# Patient Record
Sex: FEMALE | NOT HISPANIC OR LATINO | URBAN - METROPOLITAN AREA
[De-identification: names, ages, dates, MRNs, and addresses within clinical notes are randomized per-mention and may not be internally consistent; named-entity substitution may affect disease eponyms.]

---

## 2017-07-25 ENCOUNTER — APPOINTMENT (OUTPATIENT)
Dept: GENERAL RADIOLOGY | Facility: CLINIC | Age: 52
End: 2017-07-25
Attending: EMERGENCY MEDICINE
Payer: COMMERCIAL

## 2017-07-25 ENCOUNTER — HOSPITAL ENCOUNTER (EMERGENCY)
Facility: CLINIC | Age: 52
Discharge: HOME | End: 2017-07-25
Attending: EMERGENCY MEDICINE | Admitting: EMERGENCY MEDICINE
Payer: COMMERCIAL

## 2017-07-25 VITALS
DIASTOLIC BLOOD PRESSURE: 79 MMHG | SYSTOLIC BLOOD PRESSURE: 147 MMHG | RESPIRATION RATE: 16 BRPM | OXYGEN SATURATION: 96 % | TEMPERATURE: 99.2 F

## 2017-07-25 DIAGNOSIS — J01.00 ACUTE NON-RECURRENT MAXILLARY SINUSITIS: ICD-10-CM

## 2017-07-25 LAB
ALBUMIN UR-MCNC: NEGATIVE MG/DL
ANION GAP SERPL CALCULATED.3IONS-SCNC: 8 MMOL/L (ref 3–14)
APPEARANCE UR: CLEAR
BASOPHILS # BLD AUTO: 0 10E9/L (ref 0–0.2)
BASOPHILS NFR BLD AUTO: 0 %
BILIRUB UR QL STRIP: NEGATIVE
BUN SERPL-MCNC: 11 MG/DL (ref 7–30)
CALCIUM SERPL-MCNC: 9 MG/DL (ref 8.5–10.1)
CHLORIDE SERPL-SCNC: 105 MMOL/L (ref 94–109)
CO2 SERPL-SCNC: 30 MMOL/L (ref 20–32)
COLOR UR AUTO: NORMAL
CREAT SERPL-MCNC: 0.61 MG/DL (ref 0.52–1.04)
DIFFERENTIAL METHOD BLD: NORMAL
EOSINOPHIL # BLD AUTO: 0.2 10E9/L (ref 0–0.7)
EOSINOPHIL NFR BLD AUTO: 2.1 %
ERYTHROCYTE [DISTWIDTH] IN BLOOD BY AUTOMATED COUNT: 13.1 % (ref 10–15)
GFR SERPL CREATININE-BSD FRML MDRD: ABNORMAL ML/MIN/1.7M2
GLUCOSE SERPL-MCNC: 110 MG/DL (ref 70–99)
GLUCOSE UR STRIP-MCNC: NEGATIVE MG/DL
HCT VFR BLD AUTO: 42.3 % (ref 35–47)
HGB BLD-MCNC: 14 G/DL (ref 11.7–15.7)
HGB UR QL STRIP: NEGATIVE
IMM GRANULOCYTES # BLD: 0 10E9/L (ref 0–0.4)
IMM GRANULOCYTES NFR BLD: 0.1 %
KETONES UR STRIP-MCNC: NEGATIVE MG/DL
LACTATE BLD-SCNC: 1.4 MMOL/L (ref 0.7–2.1)
LEUKOCYTE ESTERASE UR QL STRIP: NEGATIVE
LYMPHOCYTES # BLD AUTO: 0.8 10E9/L (ref 0.8–5.3)
LYMPHOCYTES NFR BLD AUTO: 11.1 %
MCH RBC QN AUTO: 28.1 PG (ref 26.5–33)
MCHC RBC AUTO-ENTMCNC: 33.1 G/DL (ref 31.5–36.5)
MCV RBC AUTO: 85 FL (ref 78–100)
MONOCYTES # BLD AUTO: 0.1 10E9/L (ref 0–1.3)
MONOCYTES NFR BLD AUTO: 2 %
NEUTROPHILS # BLD AUTO: 6 10E9/L (ref 1.6–8.3)
NEUTROPHILS NFR BLD AUTO: 84.7 %
NITRATE UR QL: NEGATIVE
NRBC # BLD AUTO: 0 10*3/UL
NRBC BLD AUTO-RTO: 0 /100
PH UR STRIP: 6 PH (ref 5–7)
PLATELET # BLD AUTO: 178 10E9/L (ref 150–450)
POTASSIUM SERPL-SCNC: 3.6 MMOL/L (ref 3.4–5.3)
RBC # BLD AUTO: 4.99 10E12/L (ref 3.8–5.2)
RBC #/AREA URNS AUTO: <1 /HPF (ref 0–2)
SODIUM SERPL-SCNC: 143 MMOL/L (ref 133–144)
SP GR UR STRIP: 1.01 (ref 1–1.03)
URN SPEC COLLECT METH UR: NORMAL
UROBILINOGEN UR STRIP-MCNC: NORMAL MG/DL (ref 0–2)
WBC # BLD AUTO: 7.1 10E9/L (ref 4–11)
WBC #/AREA URNS AUTO: <1 /HPF (ref 0–2)

## 2017-07-25 PROCEDURE — 99284 EMERGENCY DEPT VISIT MOD MDM: CPT | Mod: 25 | Performed by: EMERGENCY MEDICINE

## 2017-07-25 PROCEDURE — 81001 URINALYSIS AUTO W/SCOPE: CPT | Performed by: EMERGENCY MEDICINE

## 2017-07-25 PROCEDURE — 80048 BASIC METABOLIC PNL TOTAL CA: CPT | Performed by: EMERGENCY MEDICINE

## 2017-07-25 PROCEDURE — 25000128 H RX IP 250 OP 636: Performed by: EMERGENCY MEDICINE

## 2017-07-25 PROCEDURE — 25000132 ZZH RX MED GY IP 250 OP 250 PS 637: Performed by: EMERGENCY MEDICINE

## 2017-07-25 PROCEDURE — 99284 EMERGENCY DEPT VISIT MOD MDM: CPT | Mod: Z6 | Performed by: EMERGENCY MEDICINE

## 2017-07-25 PROCEDURE — 71020 XR CHEST 2 VW: CPT

## 2017-07-25 PROCEDURE — 87040 BLOOD CULTURE FOR BACTERIA: CPT | Performed by: EMERGENCY MEDICINE

## 2017-07-25 PROCEDURE — 85025 COMPLETE CBC W/AUTO DIFF WBC: CPT | Performed by: EMERGENCY MEDICINE

## 2017-07-25 PROCEDURE — 83605 ASSAY OF LACTIC ACID: CPT | Performed by: EMERGENCY MEDICINE

## 2017-07-25 PROCEDURE — 96361 HYDRATE IV INFUSION ADD-ON: CPT | Performed by: EMERGENCY MEDICINE

## 2017-07-25 PROCEDURE — 96360 HYDRATION IV INFUSION INIT: CPT | Performed by: EMERGENCY MEDICINE

## 2017-07-25 RX ORDER — ACETAMINOPHEN 325 MG/1
650 TABLET ORAL EVERY 4 HOURS PRN
Status: DISCONTINUED | OUTPATIENT
Start: 2017-07-25 | End: 2017-07-25 | Stop reason: HOSPADM

## 2017-07-25 RX ORDER — LIDOCAINE 40 MG/G
CREAM TOPICAL
Status: DISCONTINUED | OUTPATIENT
Start: 2017-07-25 | End: 2017-07-25 | Stop reason: HOSPADM

## 2017-07-25 RX ORDER — ONDANSETRON 2 MG/ML
4 INJECTION INTRAMUSCULAR; INTRAVENOUS EVERY 30 MIN PRN
Status: DISCONTINUED | OUTPATIENT
Start: 2017-07-25 | End: 2017-07-25 | Stop reason: HOSPADM

## 2017-07-25 RX ORDER — MONTELUKAST SODIUM 10 MG/1
10 TABLET ORAL AT BEDTIME
COMMUNITY

## 2017-07-25 RX ORDER — SODIUM CHLORIDE 9 MG/ML
1000 INJECTION, SOLUTION INTRAVENOUS CONTINUOUS
Status: DISCONTINUED | OUTPATIENT
Start: 2017-07-25 | End: 2017-07-25 | Stop reason: HOSPADM

## 2017-07-25 RX ORDER — CETIRIZINE HYDROCHLORIDE 10 MG/1
10 TABLET ORAL DAILY
COMMUNITY

## 2017-07-25 RX ADMIN — ACETAMINOPHEN 650 MG: 325 TABLET, FILM COATED ORAL at 04:12

## 2017-07-25 RX ADMIN — SODIUM CHLORIDE 1000 ML: 9 INJECTION, SOLUTION INTRAVENOUS at 04:10

## 2017-07-25 RX ADMIN — SODIUM CHLORIDE 1000 ML: 9 INJECTION, SOLUTION INTRAVENOUS at 05:22

## 2017-07-25 RX ADMIN — AMOXICILLIN AND CLAVULANATE POTASSIUM 1 TABLET: 875; 125 TABLET, FILM COATED ORAL at 06:01

## 2017-07-25 ASSESSMENT — ENCOUNTER SYMPTOMS
DIFFICULTY URINATING: 0
SORE THROAT: 0
MYALGIAS: 0
COUGH: 1
DYSURIA: 0
FATIGUE: 0
EYE REDNESS: 0
ARTHRALGIAS: 0
NECK PAIN: 0
BACK PAIN: 0
SINUS PRESSURE: 1
NAUSEA: 1
RHINORRHEA: 1
EYE PAIN: 0
VOMITING: 1
BRUISES/BLEEDS EASILY: 0
TROUBLE SWALLOWING: 0
PALPITATIONS: 0
FACIAL SWELLING: 0
CHILLS: 1
DIZZINESS: 0
ABDOMINAL PAIN: 0
FLANK PAIN: 0
CONFUSION: 0
LIGHT-HEADEDNESS: 0
CHEST TIGHTNESS: 0
FEVER: 1
DIAPHORESIS: 0
FREQUENCY: 0
SHORTNESS OF BREATH: 1
ALLERGIC REACTION: 1
HEADACHES: 0
DIARRHEA: 0
WEAKNESS: 0
NECK STIFFNESS: 0

## 2017-07-25 NOTE — ED AVS SNAPSHOT
[facility], Emergency Department    [address]    [place] [address]    Phone:  [phone]    Fax:  [phone]                                       Orquidea Romero   MRN: [id number]    Department:  [facility], Emergency Department   Date of Visit:  7/25/2017           Patient Information     Date Of Birth          1965        Your diagnoses for this visit were:     Acute non-recurrent maxillary sinusitis        You were seen by Gerardo Morales MD.      Follow-up Information     Follow up with Kaleigh Santoro Md.    Specialty:  Clinic    Contact information:    [address] [facility] [address]  [phone]          Discharge Instructions       Take antibiotics as directed.  Take Tylenol (acetaminophen) for pain or fever.  Clinic follow up this week at your primary care provider clinic or at clinic below.  Drink plenty of fluids.  Please make an appointment to follow up with [facility] (phone: [phone])     24 Hour Appointment Hotline       To make an appointment at any [facility], call [phone] ([phone]). If you don't have a family doctor or clinic, we will help you find one. [facility] are conveniently located to serve the needs of you and your family.             Review of your medicines      START taking        Dose / Directions Last dose taken    amoxicillin-clavulanate 875-125 MG per tablet   Commonly known as:  AUGMENTIN   Dose:  1 tablet   Quantity:  20 tablet        Take 1 tablet by mouth 2 times daily for 10 days   Refills:  0          Our records show that you are taking the medicines listed below. If these are incorrect, please call your family doctor or clinic.        Dose / Directions Last dose taken    cetirizine 10 MG tablet   Commonly known as:  zyrTEC   Dose:  10 mg        Take 10 mg by mouth daily   Refills:  0        fluticasone 50 MCG/ACT spray   Commonly known as:  FLONASE   Dose:  2 spray        2 sprays by Both  Nostrils route daily.   Refills:  0        montelukast 10 MG tablet   Commonly known as:  SINGULAIR   Dose:  10 mg        Take 10 mg by mouth At Bedtime   Refills:  0        TYLENOL 325 MG tablet   Dose:  1-2 tablet   Generic drug:  acetaminophen        Take 1-2 tablets by mouth every 6 hours as needed.   Refills:  0                Prescriptions were sent or printed at these locations (1 Prescription)                   Other Prescriptions                Printed at Department/Unit printer (1 of 1)         amoxicillin-clavulanate (AUGMENTIN) 875-125 MG per tablet                Procedures and tests performed during your visit     Basic metabolic panel    Blood culture ONE site    CBC with platelets differential    Chest XR,  PA & LAT    Lactic acid whole blood    Peripheral IV catheter    UA with Microscopic reflex to Culture      Orders Needing Specimen Collection     None      Pending Results     Date and Time Order Name Status Description    7/25/2017 0351 Blood culture ONE site In process     7/25/2017 0351 Chest XR,  PA & LAT Preliminary             Pending Culture Results     Date and Time Order Name Status Description    7/25/2017 0351 Blood culture ONE site In process             Pending Results Instructions     If you had any lab results that were not finalized at the time of your Discharge, you can call the ED Lab Result RN at [phone]. You will be contacted by this team for any positive Lab results or changes in treatment. The nurses are available 7 days a week from 10A to 6:30P.  You can leave a message 24 hours per day and they will return your call.        Thank you for choosing [facility]       Thank you for choosing [facility] for your care. Our goal is always to provide you with excellent care. Hearing back from our patients is one way we can continue to improve our services. Please take a few minutes to complete the written survey that you may receive in the mail after you visit with us. Thank you!    "     MyChart Information     [employer] lets you send messages to your doctor, view your test results, renew your prescriptions, schedule appointments and more. To sign up, go to [email or web address] . Click on \"Log in\" on the left side of the screen, which will take you to the Welcome page. Then click on \"Sign up Now\" on the right side of the page.     You will be asked to enter the access code listed below, as well as some personal information. Please follow the directions to create your username and password.     Your access code is: [email or web address]  Expires: 10/23/2017  5:51 AM     Your access code will  in 90 days. If you need help or a new code, please call your [facility] clinic or [phone].        Care EveryWhere ID     This is your Care EveryWhere ID. This could be used by other organizations to access your [facility] medical records  [id number]        Equal Access to Services     NICOLE BORRERO : Hadii denny gauthiero Sojeri, waaxda luqadaha, qaybta kaalmada adenorth, rocky bryant . So [facility] [phone].    ATENCIÓN: Si habla español, tiene a mata disposición servicios gratuitos de asistencia lingüística. Llame al [phone].    We comply with applicable federal civil rights laws and [place] laws. We do not discriminate on the basis of race, color, national origin, age, disability sex, sexual orientation or gender identity.            After Visit Summary       This is your record. Keep this with you and show to your community pharmacist(s) and doctor(s) at your next visit.                  "

## 2017-07-25 NOTE — ED AVS SNAPSHOT
[facility], [place], Emergency Department    [address]    [place] [address]    Phone:  [phone]    Fax:  [phone]                                       Orquidea Romero   MRN: [id number]    Department:  [facility], Emergency Department   Date of Visit:  7/25/2017           After Visit Summary Signature Page     I have received my discharge instructions, and my questions have been answered. I have discussed any challenges I see with this plan with the nurse or doctor.    ..........................................................................................................................................  Patient/Patient Representative Signature      ..........................................................................................................................................  Patient Representative Print Name and Relationship to Patient    ..................................................               ................................................  Date                                            Time    ..........................................................................................................................................  Reviewed by Signature/Title    ...................................................              ..............................................  Date                                                            Time

## 2017-07-25 NOTE — DISCHARGE INSTRUCTIONS
Take antibiotics as directed.  Take Tylenol (acetaminophen) for pain or fever.  Clinic follow up this week at your primary care provider clinic or at clinic below.  Drink plenty of fluids.  Please make an appointment to follow up with Located within [facility] (phone: [phone])

## 2017-07-25 NOTE — ED PROVIDER NOTES
History     Chief Complaint   Patient presents with     Allergic Reaction     rash, cough, fever started about an hour ago after taking meds     Patient is a 52 year old female presenting with allergic reaction. The history is provided by the patient and a relative. The history is limited by a language barrier. No  was used (patient and family member present decline .).   Allergic Reaction   Presenting symptoms: no difficulty swallowing      Orquidea Romero is a 52 year old female who presents with onset today of fever, cough, nasal congestion, rhinorrhea, vomiting. Skin on chest and neck appeared red to her earlier but is no longer so. No pruritus. No new medications. Cough is frequent and productive. No confusion or other mental status change. No diarrhea, melena or hematemesis. No chest pain but feels short of breath. Reports maxillary sinus pain and pressure.   I have reviewed the Medications, Allergies, Past Medical and Surgical History, and Social History in the [employer] system.  History reviewed. No pertinent past medical history.    Review of Systems   Constitutional: Positive for chills and fever. Negative for diaphoresis and fatigue.   HENT: Positive for congestion, postnasal drip, rhinorrhea and sinus pressure. Negative for dental problem, ear pain, facial swelling, mouth sores, sore throat and trouble swallowing.    Eyes: Negative for pain, redness and visual disturbance.   Respiratory: Positive for cough and shortness of breath. Negative for chest tightness.    Cardiovascular: Negative for chest pain, palpitations and leg swelling.   Gastrointestinal: Positive for nausea and vomiting. Negative for abdominal pain and diarrhea.   Genitourinary: Negative for difficulty urinating, dysuria, flank pain and frequency.   Musculoskeletal: Negative for arthralgias, back pain, myalgias, neck pain and neck stiffness.   Allergic/Immunologic: Negative for immunocompromised state.    Neurological: Negative for dizziness, syncope, weakness, light-headedness and headaches.   Hematological: Does not bruise/bleed easily.   Psychiatric/Behavioral: Negative for confusion.       Physical Exam   BP: 150/80  Heart Rate: 112  Temp: 100.7  F (38.2  C)  Resp: 18  SpO2: 94 %  Physical Exam   Constitutional: She appears well-developed and well-nourished. No distress.   HENT:   Head: Normocephalic and atraumatic.   Right Ear: Tympanic membrane and ear canal normal.   Left Ear: Tympanic membrane and ear canal normal.   Nose: Mucosal edema and rhinorrhea present. Right sinus exhibits maxillary sinus tenderness. Right sinus exhibits no frontal sinus tenderness. Left sinus exhibits maxillary sinus tenderness. Left sinus exhibits no frontal sinus tenderness.   Mouth/Throat: Uvula is midline, oropharynx is clear and moist and mucous membranes are normal. No oral lesions. No trismus in the jaw. No uvula swelling.   Eyes: Conjunctivae and EOM are normal. Pupils are equal, round, and reactive to light.   Neck: Normal range of motion. Neck supple.   No nuchal rigidity. No meningeal signs.   Cardiovascular: Regular rhythm, normal heart sounds and intact distal pulses.  Exam reveals no gallop and no friction rub.    No murmur heard.  Pulmonary/Chest: Effort normal and breath sounds normal. No respiratory distress.   Abdominal: Soft. There is no tenderness.   Musculoskeletal: Normal range of motion. She exhibits no edema or tenderness.   Neurological: She is alert.   Skin: Skin is warm and dry. No rash noted.   Psychiatric: She has a normal mood and affect. Her behavior is normal.   Nursing note and vitals reviewed.      ED Course     ED Course     Procedures             Critical Care time:  none               Labs Ordered and Resulted from Time of ED Arrival Up to the Time of Departure from the ED   BASIC METABOLIC PANEL - Abnormal; Notable for the following:        Result Value    Glucose 110 (*)     All other  components within normal limits   CBC WITH PLATELETS DIFFERENTIAL   LACTIC ACID WHOLE BLOOD   ROUTINE UA WITH MICROSCOPIC REFLEX TO CULTURE   PERIPHERAL IV CATHETER   BLOOD CULTURE     Medications   lidocaine 1 % 1 mL (not administered)   lidocaine (LMX4) kit (not administered)   sodium chloride (PF) 0.9% PF flush 3 mL (not administered)   sodium chloride (PF) 0.9% PF flush 3 mL (not administered)   0.9% sodium chloride BOLUS (0 mLs Intravenous Stopped 7/25/17 0552)     Followed by   0.9% sodium chloride infusion (0 mLs Intravenous Stopped 7/25/17 0552)   ondansetron (ZOFRAN) injection 4 mg (not administered)   acetaminophen (TYLENOL) tablet 650 mg (650 mg Oral Given 7/25/17 0412)   amoxicillin-clavulanate (AUGMENTIN) 875-125 MG per tablet 1 tablet (not administered)            Assessments & Plan (with Medical Decision Making)   Findings consistent with maxillary sinusitis. No evidence of sepsis or pneumonia. She is awake and alert and feels better with ED treatment. She is tolerating oral fluids well. Will treat with Augmentin and symptomatic measures. No vomiting in the ED. Follow up in clinic and return if persistent symptoms.  I have reviewed the nursing notes.    I have reviewed the findings, diagnosis, plan and need for follow up with the patient.    New Prescriptions    AMOXICILLIN-CLAVULANATE (AUGMENTIN) 875-125 MG PER TABLET    Take 1 tablet by mouth 2 times daily for 10 days       Final diagnoses:   Acute non-recurrent maxillary sinusitis       7/25/2017   [facility], [place], EMERGENCY DEPARTMENT     Gerardo Morales MD  07/25/17 0588

## 2017-07-31 LAB
BACTERIA SPEC CULT: NO GROWTH
Lab: NORMAL
MICRO REPORT STATUS: NORMAL
SPECIMEN SOURCE: NORMAL

## 2020-11-13 ENCOUNTER — DOCUMENTATION ONLY (OUTPATIENT)
Dept: CARE COORDINATION | Facility: CLINIC | Age: 55
End: 2020-11-13

## 2020-12-15 ENCOUNTER — TRANSFERRED RECORDS (OUTPATIENT)
Dept: HEALTH INFORMATION MANAGEMENT | Facility: CLINIC | Age: 55
End: 2020-12-15

## 2020-12-16 ENCOUNTER — VIRTUAL VISIT (OUTPATIENT)
Dept: NEUROLOGY | Facility: CLINIC | Age: 55
End: 2020-12-16
Payer: COMMERCIAL

## 2020-12-16 ENCOUNTER — PRE VISIT (OUTPATIENT)
Dept: NEUROLOGY | Facility: CLINIC | Age: 55
End: 2020-12-16

## 2020-12-16 DIAGNOSIS — G43.711 INTRACTABLE CHRONIC MIGRAINE WITHOUT AURA AND WITH STATUS MIGRAINOSUS: ICD-10-CM

## 2020-12-16 DIAGNOSIS — R51.9 CHRONIC DAILY HEADACHE: Primary | ICD-10-CM

## 2020-12-16 PROCEDURE — 99203 OFFICE O/P NEW LOW 30 MIN: CPT | Mod: 95 | Performed by: PSYCHIATRY & NEUROLOGY

## 2020-12-16 RX ORDER — LORATADINE 10 MG/1
10 TABLET ORAL
COMMUNITY

## 2020-12-16 RX ORDER — MECLIZINE HYDROCHLORIDE 25 MG/1
25 TABLET ORAL 3 TIMES DAILY PRN
COMMUNITY

## 2020-12-16 RX ORDER — OMEPRAZOLE 40 MG/1
40 CAPSULE, DELAYED RELEASE ORAL
COMMUNITY
Start: 2020-08-12

## 2020-12-16 RX ORDER — DIPHENHYDRAMINE HCL 25 MG
25 CAPSULE ORAL
COMMUNITY

## 2020-12-16 RX ORDER — PROPRANOLOL HYDROCHLORIDE 80 MG/1
80 TABLET ORAL 2 TIMES DAILY
Qty: 60 TABLET | Refills: 3 | Status: SHIPPED | OUTPATIENT
Start: 2020-12-16 | End: 2021-01-28

## 2020-12-16 RX ORDER — SUMATRIPTAN 50 MG/1
50 TABLET, FILM COATED ORAL
Qty: 18 TABLET | Refills: 3 | Status: SHIPPED | OUTPATIENT
Start: 2020-12-16

## 2020-12-16 RX ORDER — LEVOTHYROXINE SODIUM 25 UG/1
1 TABLET ORAL
COMMUNITY
Start: 2020-08-08

## 2020-12-16 NOTE — LETTER
"12/16/2020       RE: Orquidea Romero  [address] [place] [address]     Dear Colleague,    Thank you for referring your patient, Orquidea Romero, to the [facility] NEUROLOGY CLINIC [place] at [facility]. Please see a copy of my visit note below.          Orquidea Romero is a 55 year old female who is being evaluated via a billable video visit.      The patient has been notified of following:     \"This video visit will be conducted via a call between you and your physician/provider. We have found that certain health care needs can be provided without the need for an in-person physical exam.  This service lets us provide the care you need with a video conversation.  If a prescription is necessary we can send it directly to your pharmacy.  If lab work is needed we can place an order for that and you can then stop by our lab to have the test done at a later time.    Video visits are billed at different rates depending on your insurance coverage.  Please reach out to your insurance provider with any questions.    If during the course of the call the physician/provider feels a video visit is not appropriate, you will not be charged for this service.\"    Patient has given verbal consent for Video visit? Yes  How would you like to obtain your AVS? MyChart  If you are dropped from the video visit, the video invite should be resent to: Send to e-mail at: [email or web address]  Will anyone else be joining your video visit? No        Video-Visit Details    Type of service:  Video Visit    Video Start Time: 10:04 AM  Video End Time: 10:47 AM    Originating Location (pt. Location): Home    Distant Location (provider location):  [facility] NEUROLOGY [facility]     Platform used for Video Visit: Minnie Mayer    [facility]  Virtual Neurology Consult     Orquidea Romero MRN# [id number]   Date " of Birth: 1965 Age: 55 year old            Assessment and Recommendations:     Orquidea Romero is a 55 year old female with past medical history of GERD, allergy presents to the headache clinic for evaluation of severe headache.    Patient has been having headache for years which got worse in the past 3 months.  This headache is consistent with chronic migraine with visual aura.  Although the neurological exam is normal, this acute change in the severity and frequency of her headache warrants brain imaging to rule out structural lesion.  Sumatriptan worked some for her headache but she was using it regularly twice a day.  She tried cyclobenzaprine and amitriptyline at the same time and she developed allergy.  We do not know which of them caused the allergy.  The patient discontinued both.  We discussed treatment of migraine that includes:  -For the acute migraine attack, we recommended sumatriptan to be to be used immediately when she develops the aura or early on before the headache gets worse.  She can use a second tablet if the headache continues after 2 hours of taking the first 1.  She does not have nausea during the headache so we did not recommend any antinausea medication  -For prevention: Given that her heart rate and blood pressure are running on the higher side, we recommended propranolol as a preventive medicine that also can help the heart rate and the blood pressure.  We recommended 1 tablet of 80 mg daily for a week.  If she tolerates it, she will increase the dose to 80 mg twice daily.  -We will get an MRI of the brain to rule out/in any structural brain lesion  -We will see her virtually in 3 months to discuss the MRI finding and the effect of medications    Patient was seen and discussed with Dr. Leonardo who agreed on the assessment and plan      Beatriz Artis MD  PGY 3 neurology    Physician Attestation   Jazzy OLSEN MD, saw this patient and agree with the findings and plan of care as  documented in the note.      I spent 43 minutes with the patient, over half of which was counseling.  MD Jazzy Perez MD  Neurology            Chief Complaint:     Chief Complaint   Patient presents with     Consult     VIDEO VISIT NEW           History is obtained from the patient and medical record.  Patient was seen via a virtual visit in their home due to the Covid 19 global pandemic.      Orquidea Romero is a 55 year old female with past medical history of GERD, allergy presents to the headache clinic for evaluation of severe headache.    Patient has been having headache for years which got worse in the past 3 months.  She used to have 1 headache per week or every 2-week which was responding to Tylenol.  In the past 3 months she has been having daily headache.  She describes the headache as pulsating, throbbing pain which usually starts in the left temporal region or the front of the head then progresses to involve the whole head and the back of the head.  The headache starts mild and worsens over time and become severe enough to wake her up from sleep at night.  The severity of the headache is 7-8/10.  It can happen during the daytime or nighttime.  It wakes her up from sleep if it happens during the nighttime.  She denies nausea and vomiting with a headache, but she endorsed phonophobia, photophobia, blurry vision and balance issues during the headache.  The heat is the trigger for her headache.  She does not recall any change in her life, change in her medications, or any new trigger before the last 3 months that could be an exacerbating factor.   She tried cyclobenzaprine and amitriptyline at the same time and developed skin rash and difficulty urination and dysuria.  So she stopped both of them.  She also tried sumatriptan 50 mg.  She was taking it 2 times daily regularly until she ran out of it.  Which helped calming down headache but did not take it away completely.  Additionally she  was taking Tylenol as needed.  Tylenol used to work in the past but now it does not seem to help.     She denies weakness or sensory changes.             Past Medical History:   No past medical history on file.           Past Surgical History:   No past surgical history on file.          Social History:     Social History     Socioeconomic History     Marital status: Single     Spouse name: Not on file     Number of children: Not on file     Years of education: Not on file     Highest education level: Not on file   Occupational History     Not on file   Social Needs     Financial resource strain: Not on file     Food insecurity     Worry: Not on file     Inability: Not on file     Transportation needs     Medical: Not on file     Non-medical: Not on file   Tobacco Use     Smoking status: Never Smoker     Smokeless tobacco: Never Used   Substance and Sexual Activity     Alcohol use: No     Drug use: No     Sexual activity: Not on file   Lifestyle     Physical activity     Days per week: Not on file     Minutes per session: Not on file     Stress: Not on file   Relationships     Social connections     Talks on phone: Not on file     Gets together: Not on file     Attends Restoration service: Not on file     Active member of club or organization: Not on file     Attends meetings of clubs or organizations: Not on file     Relationship status: Not on file     Intimate partner violence     Fear of current or ex partner: Not on file     Emotionally abused: Not on file     Physically abused: Not on file     Forced sexual activity: Not on file   Other Topics Concern     Not on file   Social History Narrative     Not on file             Family History:   No family history on file.   No FH of migraine          Allergies:      Allergies   Allergen Reactions     Sulfa Drugs      Other reaction(s): Edema, Hives             Medications:     Current Outpatient Medications:      cholecalciferol 50 MCG (2000 UT) tablet, Take 2,000  Units by mouth, Disp: , Rfl:      meclizine (ANTIVERT) 25 MG tablet, Take 25 mg by mouth 3 times daily as needed for dizziness, Disp: , Rfl:      montelukast (SINGULAIR) 10 MG tablet, Take 10 mg by mouth At Bedtime, Disp: , Rfl:      omeprazole (PRILOSEC) 40 MG DR capsule, Take 40 mg by mouth, Disp: , Rfl:      propranolol (INDERAL) 80 MG tablet, Take 1 tablet (80 mg) by mouth 2 times daily Take once a day for one week, then increase to twice daily., Disp: 60 tablet, Rfl: 3     SUMAtriptan (IMITREX) 50 MG tablet, Take 1 tablet (50 mg) by mouth at onset of headache for migraine (repeat in 2 hours if needed) May repeat in 2 hours. Max 4 tablets/24 hours., Disp: 18 tablet, Rfl: 3     acetaminophen (TYLENOL) 325 MG tablet, Take 1-2 tablets by mouth every 6 hours as needed., Disp: , Rfl:      cetirizine (ZYRTEC) 10 MG tablet, Take 10 mg by mouth daily, Disp: , Rfl:      diphenhydrAMINE (BENADRYL) 25 MG capsule, Take 25 mg by mouth, Disp: , Rfl:      fluticasone (FLONASE) 50 MCG/ACT nasal spray, 2 sprays by Both Nostrils route daily., Disp: , Rfl:      levothyroxine (SYNTHROID/LEVOTHROID) 25 MCG tablet, Take 1 tablet by mouth, Disp: , Rfl:      loratadine (CLARITIN) 10 MG tablet, Take 10 mg by mouth, Disp: , Rfl:           Review of Systems:   As noted in the HPI.         Physical Exam:   There were no vitals taken for this visit.   Physical Exam:   General: NAD  Neurologic:   Mental Status Exam: Alert, awake and oriented to situation. No dysarthria. Speech of normal fluency.   Cranial Nerves: EOMs intact, no nystagmus, facial movements symmetric, hearing intact to conversation, tongue midline and fully mobile. No tongue atrophy or fasciculations.    Motor: No drift in upper extremities. Able to stand from a seated position without use of arms. No tremors or abnormal movements noted.    Gait: Normal stance and casual gait.    Neck: Normal range of motion with lateral head movements and neck flexion.  Eyes: No conjunctival  injection, no scleral icterus.           Data:     No brain imaging studies in her chart.            Again, thank you for allowing me to participate in the care of your patient.      Sincerely,    Jazzy Leonardo MD

## 2020-12-16 NOTE — PROGRESS NOTES
"Orquidea Romero is a 55 year old female who is being evaluated via a billable video visit.      The patient has been notified of following:     \"This video visit will be conducted via a call between you and your physician/provider. We have found that certain health care needs can be provided without the need for an in-person physical exam.  This service lets us provide the care you need with a video conversation.  If a prescription is necessary we can send it directly to your pharmacy.  If lab work is needed we can place an order for that and you can then stop by our lab to have the test done at a later time.    Video visits are billed at different rates depending on your insurance coverage.  Please reach out to your insurance provider with any questions.    If during the course of the call the physician/provider feels a video visit is not appropriate, you will not be charged for this service.\"    Patient has given verbal consent for Video visit? Yes  How would you like to obtain your AVS? MyChart  If you are dropped from the video visit, the video invite should be resent to: Send to e-mail at: [email or web address]  Will anyone else be joining your video visit? No        Video-Visit Details    Type of service:  Video Visit    Video Start Time: 10:04 AM  Video End Time: 10:47 AM    Originating Location (pt. Location): Home    Distant Location (provider location):  [facility]OLOG[facility] [place]     Platform used for Video Visit: [facility] Machado [facility]    [facility]   Clinics and Surgery Center  Virtual Neurology Consult     Orquidea Romero MRN# [id number]   YOB: 1965 Age: 55 year old            Assessment and Recommendations:     Orquidea Romero is a 55 year old female with past medical history of GERD, allergy presents to the headache clinic for evaluation of severe headache.    Patient has been having headache for years which got worse in the past 3 months.  " This headache is consistent with chronic migraine with visual aura.  Although the neurological exam is normal, this acute change in the severity and frequency of her headache warrants brain imaging to rule out structural lesion.  Sumatriptan worked some for her headache but she was using it regularly twice a day.  She tried cyclobenzaprine and amitriptyline at the same time and she developed allergy.  We do not know which of them caused the allergy.  The patient discontinued both.  We discussed treatment of migraine that includes:  -For the acute migraine attack, we recommended sumatriptan to be to be used immediately when she develops the aura or early on before the headache gets worse.  She can use a second tablet if the headache continues after 2 hours of taking the first 1.  She does not have nausea during the headache so we did not recommend any antinausea medication  -For prevention: Given that her heart rate and blood pressure are running on the higher side, we recommended propranolol as a preventive medicine that also can help the heart rate and the blood pressure.  We recommended 1 tablet of 80 mg daily for a week.  If she tolerates it, she will increase the dose to 80 mg twice daily.  -We will get an MRI of the brain to rule out/in any structural brain lesion  -We will see her virtually in 3 months to discuss the MRI finding and the effect of medications    Patient was seen and discussed with Dr. Leonardo who agreed on the assessment and plan      Beatriz Artis MD  PGY 3 neurology    Physician Attestation   I, Jazzy Leonardo MD, saw this patient and agree with the findings and plan of care as documented in the note.      I spent 43 minutes with the patient, over half of which was counseling.  MD Jazzy Perez MD  Neurology            Chief Complaint:     Chief Complaint   Patient presents with     Consult     VIDEO VISIT NEW           History is obtained from the patient and medical  record.  Patient was seen via a virtual visit in their home due to the Covid 19 global pandemic.      Orquidea Romero is a 55 year old female with past medical history of GERD, allergy presents to the headache clinic for evaluation of severe headache.    Patient has been having headache for years which got worse in the past 3 months.  She used to have 1 headache per week or every 2-week which was responding to Tylenol.  In the past 3 months she has been having daily headache.  She describes the headache as pulsating, throbbing pain which usually starts in the left temporal region or the front of the head then progresses to involve the whole head and the back of the head.  The headache starts mild and worsens over time and become severe enough to wake her up from sleep at night.  The severity of the headache is 7-8/10.  It can happen during the daytime or nighttime.  It wakes her up from sleep if it happens during the nighttime.  She denies nausea and vomiting with a headache, but she endorsed phonophobia, photophobia, blurry vision and balance issues during the headache.  The heat is the trigger for her headache.  She does not recall any change in her life, change in her medications, or any new trigger before the last 3 months that could be an exacerbating factor.   She tried cyclobenzaprine and amitriptyline at the same time and developed skin rash and difficulty urination and dysuria.  So she stopped both of them.  She also tried sumatriptan 50 mg.  She was taking it 2 times daily regularly until she ran out of it.  Which helped calming down headache but did not take it away completely.  Additionally she was taking Tylenol as needed.  Tylenol used to work in the past but now it does not seem to help.     She denies weakness or sensory changes.             Past Medical History:   No past medical history on file.           Past Surgical History:   No past surgical history on file.          Social History:     Social  History     Socioeconomic History     Marital status: Single     Spouse name: Not on file     Number of children: Not on file     Years of education: Not on file     Highest education level: Not on file   Occupational History     Not on file   Social Needs     Financial resource strain: Not on file     Food insecurity     Worry: Not on file     Inability: Not on file     Transportation needs     Medical: Not on file     Non-medical: Not on file   Tobacco Use     Smoking status: Never Smoker     Smokeless tobacco: Never Used   Substance and Sexual Activity     Alcohol use: No     Drug use: No     Sexual activity: Not on file   Lifestyle     Physical activity     Days per week: Not on file     Minutes per session: Not on file     Stress: Not on file   Relationships     Social connections     Talks on phone: Not on file     Gets together: Not on file     Attends [employer] service: Not on file     Active member of club or organization: Not on file     Attends meetings of clubs or organizations: Not on file     Relationship status: Not on file     Intimate partner violence     Fear of current or ex partner: Not on file     Emotionally abused: Not on file     Physically abused: Not on file     Forced sexual activity: Not on file   Other Topics Concern     Not on file   Social History Narrative     Not on file             Family History:   No family history on file.   No FH of migraine          Allergies:      Allergies   Allergen Reactions     Sulfa Drugs      Other reaction(s): Edema, Hives             Medications:     Current Outpatient Medications:      cholecalciferol 50 MCG (2000 UT) tablet, Take 2,000 Units by mouth, Disp: , Rfl:      meclizine (ANTIVERT) 25 MG tablet, Take 25 mg by mouth 3 times daily as needed for dizziness, Disp: , Rfl:      montelukast (SINGULAIR) 10 MG tablet, Take 10 mg by mouth At Bedtime, Disp: , Rfl:      omeprazole (PRILOSEC) 40 MG DR capsule, Take 40 mg by mouth, Disp: , Rfl:       propranolol (INDERAL) 80 MG tablet, Take 1 tablet (80 mg) by mouth 2 times daily Take once a day for one week, then increase to twice daily., Disp: 60 tablet, Rfl: 3     SUMAtriptan (IMITREX) 50 MG tablet, Take 1 tablet (50 mg) by mouth at onset of headache for migraine (repeat in 2 hours if needed) May repeat in 2 hours. Max 4 tablets/24 hours., Disp: 18 tablet, Rfl: 3     acetaminophen (TYLENOL) 325 MG tablet, Take 1-2 tablets by mouth every 6 hours as needed., Disp: , Rfl:      cetirizine (ZYRTEC) 10 MG tablet, Take 10 mg by mouth daily, Disp: , Rfl:      diphenhydrAMINE (BENADRYL) 25 MG capsule, Take 25 mg by mouth, Disp: , Rfl:      fluticasone (FLONASE) 50 MCG/ACT nasal spray, 2 sprays by Both Nostrils route daily., Disp: , Rfl:      levothyroxine (SYNTHROID/LEVOTHROID) 25 MCG tablet, Take 1 tablet by mouth, Disp: , Rfl:      loratadine (CLARITIN) 10 MG tablet, Take 10 mg by mouth, Disp: , Rfl:           Review of Systems:   As noted in the HPI.         Physical Exam:   There were no vitals taken for this visit.   Physical Exam:   General: NAD  Neurologic:   Mental Status Exam: Alert, awake and oriented to situation. No dysarthria. Speech of normal fluency.   Cranial Nerves: EOMs intact, no nystagmus, facial movements symmetric, hearing intact to conversation, tongue midline and fully mobile. No tongue atrophy or fasciculations.    Motor: No drift in upper extremities. Able to stand from a seated position without use of arms. No tremors or abnormal movements noted.    Gait: Normal stance and casual gait.    Neck: Normal range of motion with lateral head movements and neck flexion.  Eyes: No conjunctival injection, no scleral icterus.           Data:     No brain imaging studies in her chart.

## 2020-12-16 NOTE — LETTER
Date:February 25, 2021      Patient was self referred, no letter generated. Do not send.        [facility] Health Information

## 2020-12-16 NOTE — TELEPHONE ENCOUNTER
FUTURE VISIT INFORMATION      FUTURE VISIT INFORMATION:    Date: 12/16/2020    Time: 10am    Location: [facility]  REFERRAL INFORMATION:    Referring provider:  Self     Referring providers clinic:      Reason for visit/diagnosis  Headaches     RECORDS REQUESTED FROM:       Clinic name Comments Records Status Imaging Status   [facility]  Requested  Requested                                    12/16/2020-Request for Records faxed to [facility]-MR @ 549am    12/17/2020-[facility] Records scanned to Chart-MR @ 454am

## 2020-12-24 ENCOUNTER — HOSPITAL ENCOUNTER (OUTPATIENT)
Dept: MRI IMAGING | Facility: CLINIC | Age: 55
Discharge: HOME | End: 2020-12-24
Attending: PSYCHIATRY & NEUROLOGY | Admitting: PSYCHIATRY & NEUROLOGY
Payer: COMMERCIAL

## 2020-12-24 DIAGNOSIS — R51.9 CHRONIC DAILY HEADACHE: ICD-10-CM

## 2020-12-24 PROCEDURE — A9585 GADOBUTROL INJECTION: HCPCS | Performed by: PSYCHIATRY & NEUROLOGY

## 2020-12-24 PROCEDURE — 70553 MRI BRAIN STEM W/O & W/DYE: CPT

## 2020-12-24 PROCEDURE — 255N000002 HC RX 255 OP 636: Performed by: PSYCHIATRY & NEUROLOGY

## 2020-12-24 PROCEDURE — 70553 MRI BRAIN STEM W/O & W/DYE: CPT | Mod: 26 | Performed by: RADIOLOGY

## 2020-12-24 RX ORDER — GADOBUTROL 604.72 MG/ML
7 INJECTION INTRAVENOUS ONCE
Status: COMPLETED | OUTPATIENT
Start: 2020-12-24 | End: 2020-12-24

## 2020-12-24 RX ADMIN — GADOBUTROL 7 ML: 604.72 INJECTION INTRAVENOUS at 09:34

## 2021-01-14 ENCOUNTER — DOCUMENTATION ONLY (OUTPATIENT)
Dept: CARE COORDINATION | Facility: CLINIC | Age: 56
End: 2021-01-14

## 2021-01-15 ENCOUNTER — HEALTH MAINTENANCE LETTER (OUTPATIENT)
Age: 56
End: 2021-01-15

## 2021-01-28 ENCOUNTER — VIRTUAL VISIT (OUTPATIENT)
Dept: NEUROLOGY | Facility: CLINIC | Age: 56
End: 2021-01-28
Payer: COMMERCIAL

## 2021-01-28 DIAGNOSIS — G43.711 INTRACTABLE CHRONIC MIGRAINE WITHOUT AURA AND WITH STATUS MIGRAINOSUS: ICD-10-CM

## 2021-01-28 PROCEDURE — 99214 OFFICE O/P EST MOD 30 MIN: CPT | Mod: 95 | Performed by: PSYCHIATRY & NEUROLOGY

## 2021-01-28 RX ORDER — PROPRANOLOL HYDROCHLORIDE 80 MG/1
80 TABLET ORAL 2 TIMES DAILY
Qty: 60 TABLET | Refills: 11 | Status: SHIPPED | OUTPATIENT
Start: 2021-01-28

## 2021-01-28 NOTE — PROGRESS NOTES
Orquidea is a 56 year old who is being evaluated via a billable video visit.      How would you like to obtain your AVS? mychart  If the video visit is dropped, the invitation should be resent by: please send link to son's phone @  [phone]  Will anyone else be joining your video visit? YES      Video-Visit Details    Type of service:  Video Visit    Originating Location (pt. Location): Home    Distant Location (provider location):  [facility] NEUROLOGY CLINIC [place]     Platform used for Video Visit: [facility]  [phone]    [facility]  Neurology Progress Note    Subjective:    Ms. Romero returns for follow up of chronic migraine with visual aura. I last saw her in December 2020. At that time, I recommended MRI brain for evaluation for structural causes of headache; this was unrevealing for acute pathology but did show evidence of changes likely secondary to hypertension (leukoaraiosis, volume loss, chronic microhemorrhage).    She is with her son today. We discussed the MRI results and her headaches. They asked excellent questions.     Her headaches have been more likely to occur in the afternoon and evening. The headaches are less severe overall, but daily.    She describes retroorbital pain and photophobia. She has some photophobia every day, even when she does not have headache.     She was prescribed propranolol and sumatriptan. She denies taking propranolol. She tried sumatriptan on a few occasions, but had worsened photophobia and eye itchiness. She was concerned for allergic reaction and stopped taking it.    She takes Tylenol before bed, which is helpful.    Objective:    Vitals: There were no vitals taken for this visit.  General: Cooperative, NAD  Neurologic:  Mental Status: Fully alert, attentive.    Cranial Nerves: Facial movements symmetric.   Motor: No abnormal movements.      Pertinent Investigations:    MRI brain w and wo  contrast (12/24/2020):  1. No acute intracranial pathology.  2. 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  3. 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    Assessment/Plan:   Orquidea Romero is a 56-year-old woman who returns for follow-up of chronic migraine with visual aura.  She continues to have daily headaches associated with photophobia.  Her MRI was unrevealing for acute pathology, but did show changes consistent with hypertension.    Per their report, she tried the sumatriptan on several occasions, but found that it initially worsened photophobia and caused itchiness around her eyes, so she stopped taking it.  -She will continue acetaminophen as needed.  I would like for her to use this less, no more than 14 days/month.  I will encourage her to start a preventative, to make this goal more attainable.    She denies taking propranolol or any other blood pressure medication.  -We reviewed propranolol, I recommended that she titrate up to 80 mg twice a day as a trial for migraine prevention.  This may also be helpful for hypertension.  -She will let me know if she experiences any side effects.    We reviewed my chart and I gave them the scheduling phone number, so that she may contact me if there are any questions or concerns.  I spent 32 minutes on patient care and documentation.  I will plan to see her back in 3 to 6 months to monitor progress, or sooner if needed.    Jazzy Leonardo MD  Neurology

## 2021-01-28 NOTE — LETTER
Date:March 28, 2021      Patient was self referred, no letter generated. Do not send.        [facility] Health Information

## 2021-01-28 NOTE — LETTER
1/28/2021       RE: Orquidea Romero  [address] [address]     Dear Colleague,    Thank you for referring your patient, Orquidea Romero, to the [facility]OLOG[facility] [place] [facility]. Please see a copy of my visit note below.    Orquidea is a 56 year old who is being evaluated via a billable video visit.      How would you like to obtain your AVS? mychart  If the video visit is dropped, the invitation should be resent by: please send link to son's phone @  [phone]  Will anyone else be joining your video visit? YES      Video-Visit Details    Type of service:  Video Visit    Originating Location (pt. Location): Home    Distant Location (provider location):  [facility] NEUROLOGY CL[facility] [place]     Platform used for Video Visit: [employer]  [phone]    [facility]    Clinics and Surgery [place]  Neurology Progress Note    Subjective:    Ms. Romero returns for follow up of chronic migraine with visual aura. I last saw her in December 2020. At that time, I recommended MRI brain for evaluation for structural causes of headache; this was unrevealing for acute pathology but did show evidence of changes likely secondary to hypertension (leukoaraiosis, volume loss, chronic microhemorrhage).    She is with her son today. We discussed the MRI results and her headaches. They asked excellent questions.     Her headaches have been more likely to occur in the afternoon and evening. The headaches are less severe overall, but daily.    She describes retroorbital pain and photophobia. She has some photophobia every day, even when she does not have headache.     She was prescribed propranolol and sumatriptan. She denies taking propranolol. She tried sumatriptan on a few occasions, but had worsened photophobia and eye itchiness. She was concerned for allergic reaction and stopped taking it.    She takes Tylenol  before bed, which is helpful.    Objective:    Vitals: There were no vitals taken for this visit.  General: Cooperative, NAD  Neurologic:  Mental Status: Fully alert, attentive.    Cranial Nerves: Facial movements symmetric.   Motor: No abnormal movements.      Pertinent Investigations:    MRI brain w and wo contrast (12/24/2020):  1. No acute intracranial pathology.  2. 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  3. 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    Assessment/Plan:   Orquidea Romero is a 56-year-old woman who returns for follow-up of chronic migraine with visual aura.  She continues to have daily headaches associated with photophobia.  Her MRI was unrevealing for acute pathology, but did show changes consistent with hypertension.    Per their report, she tried the sumatriptan on several occasions, but found that it initially worsened photophobia and caused itchiness around her eyes, so she stopped taking it.  -She will continue acetaminophen as needed.  I would like for her to use this less, no more than 14 days/month.  I will encourage her to start a preventative, to make this goal more attainable.    She denies taking propranolol or any other blood pressure medication.  -We reviewed propranolol, I recommended that she titrate up to 80 mg twice a day as a trial for migraine prevention.  This may also be helpful for hypertension.  -She will let me know if she experiences any side effects.    We reviewed my chart and I gave them the scheduling phone number, so that she may contact me if there are any questions or concerns.  I spent 32 minutes on patient care and documentation.  I will plan to see her back in 3 to 6 months to monitor progress, or sooner if needed.    Jazzy Leonardo MD  Neurology           Again, thank you for allowing me to participate in the care of your patient.      Sincerely,    Jazzy Leonardo MD

## 2021-01-29 ENCOUNTER — TELEPHONE (OUTPATIENT)
Dept: NEUROLOGY | Facility: CLINIC | Age: 56
End: 2021-01-29

## 2021-01-29 ENCOUNTER — CARE COORDINATION (OUTPATIENT)
Dept: NEUROLOGY | Facility: CLINIC | Age: 56
End: 2021-01-29

## 2021-01-29 NOTE — PROGRESS NOTES
I spoke to pt pharmacy. They confirmed they did receive propanolol script for pt. She had picked it up last week and is not due for a refill yet.     Jocelyne LOW

## 2021-01-29 NOTE — TELEPHONE ENCOUNTER
[facility] Call Center    Phone Message    May a detailed message be left on voicemail: yes     Reason for Call: Medication Refill Request    Has the patient contacted the pharmacy for the refill? Yes   Name of medication being requested: propranolol (INDERAL) 80 MG tablet [12474] (Order [id number])  Provider who prescribed the medication: Dr. Leonardo   Pharmacy: [id number] [address] AT [facility] [address]  Date medication is needed: asap    Patient calling in regards to appointment yesterday with Dr. Leonardo - patient states that they discussed new medication and patient went to pharmacy and states they did not have the medication.    Please advise and call patient back at your earliest convenience     Action Taken: Other: [facility] NEUR[facility]    Travel Screening: Not Applicable

## 2021-01-29 NOTE — TELEPHONE ENCOUNTER
I returned pt call. I left voicemail with help of [place] . I notified pt the new medication Dr. Leonardo discussed in her visit was the propranolol. Dr. Leonardo would like her to take this once a day for a week then twice a day after that. Per her pharmacy she picked up the medication on 1/19 and is not yet due for a refill.     Jocelyne LOW

## 2021-04-29 ENCOUNTER — TELEPHONE (OUTPATIENT)
Dept: NEUROLOGY | Facility: CLINIC | Age: 56
End: 2021-04-29

## 2021-05-03 ENCOUNTER — MYC MEDICAL ADVICE (OUTPATIENT)
Dept: NEUROLOGY | Facility: CLINIC | Age: 56
End: 2021-05-03

## 2021-05-03 NOTE — TELEPHONE ENCOUNTER
Pt is due for 6 month follow-up with Dr Leonardo. Please schedule pt for an in-person or video appointment, around 7/28/21

## 2021-09-25 ENCOUNTER — HEALTH MAINTENANCE LETTER (OUTPATIENT)
Age: 56
End: 2021-09-25

## 2022-03-12 ENCOUNTER — HEALTH MAINTENANCE LETTER (OUTPATIENT)
Age: 57
End: 2022-03-12

## 2023-01-07 ENCOUNTER — HEALTH MAINTENANCE LETTER (OUTPATIENT)
Age: 58
End: 2023-01-07

## 2023-04-22 ENCOUNTER — HEALTH MAINTENANCE LETTER (OUTPATIENT)
Age: 58
End: 2023-04-22

## 2023-12-08 ENCOUNTER — APPOINTMENT (OUTPATIENT)
Dept: GENERAL RADIOLOGY | Facility: CLINIC | Age: 58
End: 2023-12-08
Attending: EMERGENCY MEDICINE
Payer: COMMERCIAL

## 2023-12-08 ENCOUNTER — HOSPITAL ENCOUNTER (EMERGENCY)
Facility: CLINIC | Age: 58
Discharge: HOME | End: 2023-12-09
Attending: EMERGENCY MEDICINE | Admitting: EMERGENCY MEDICINE
Payer: COMMERCIAL

## 2023-12-08 DIAGNOSIS — J10.1 INFLUENZA A: ICD-10-CM

## 2023-12-08 LAB
ALBUMIN SERPL BCG-MCNC: 4.3 G/DL (ref 3.5–5.2)
ALBUMIN UR-MCNC: NEGATIVE MG/DL
ALP SERPL-CCNC: 91 U/L (ref 40–150)
ALT SERPL W P-5'-P-CCNC: 9 U/L (ref 0–50)
ANION GAP SERPL CALCULATED.3IONS-SCNC: 8 MMOL/L (ref 7–15)
APPEARANCE UR: CLEAR
AST SERPL W P-5'-P-CCNC: 18 U/L (ref 0–45)
BASOPHILS # BLD AUTO: 0 10E3/UL (ref 0–0.2)
BASOPHILS NFR BLD AUTO: 0 %
BILIRUB SERPL-MCNC: 0.3 MG/DL
BILIRUB UR QL STRIP: NEGATIVE
BUN SERPL-MCNC: 9.7 MG/DL (ref 8–23)
CALCIUM SERPL-MCNC: 9.1 MG/DL (ref 8.6–10)
CHLORIDE SERPL-SCNC: 97 MMOL/L (ref 98–107)
COLOR UR AUTO: ABNORMAL
CREAT SERPL-MCNC: 0.64 MG/DL (ref 0.51–0.95)
DEPRECATED HCO3 PLAS-SCNC: 30 MMOL/L (ref 22–29)
EGFRCR SERPLBLD CKD-EPI 2021: >90 ML/MIN/1.73M2
EOSINOPHIL # BLD AUTO: 0 10E3/UL (ref 0–0.7)
EOSINOPHIL NFR BLD AUTO: 0 %
ERYTHROCYTE [DISTWIDTH] IN BLOOD BY AUTOMATED COUNT: 12.4 % (ref 10–15)
FLUAV RNA SPEC QL NAA+PROBE: POSITIVE
FLUBV RNA RESP QL NAA+PROBE: NEGATIVE
GLUCOSE SERPL-MCNC: 132 MG/DL (ref 70–99)
GLUCOSE UR STRIP-MCNC: NEGATIVE MG/DL
HCT VFR BLD AUTO: 41.1 % (ref 35–47)
HGB BLD-MCNC: 14 G/DL (ref 11.7–15.7)
HGB UR QL STRIP: NEGATIVE
IMM GRANULOCYTES # BLD: 0 10E3/UL
IMM GRANULOCYTES NFR BLD: 0 %
KETONES UR STRIP-MCNC: 10 MG/DL
LACTATE SERPL-SCNC: 1.8 MMOL/L (ref 0.7–2)
LEUKOCYTE ESTERASE UR QL STRIP: NEGATIVE
LYMPHOCYTES # BLD AUTO: 0.7 10E3/UL (ref 0.8–5.3)
LYMPHOCYTES NFR BLD AUTO: 8 %
MCH RBC QN AUTO: 28.6 PG (ref 26.5–33)
MCHC RBC AUTO-ENTMCNC: 34.1 G/DL (ref 31.5–36.5)
MCV RBC AUTO: 84 FL (ref 78–100)
MONOCYTES # BLD AUTO: 0.5 10E3/UL (ref 0–1.3)
MONOCYTES NFR BLD AUTO: 7 %
MUCOUS THREADS #/AREA URNS LPF: PRESENT /LPF
NEUTROPHILS # BLD AUTO: 6.6 10E3/UL (ref 1.6–8.3)
NEUTROPHILS NFR BLD AUTO: 85 %
NITRATE UR QL: NEGATIVE
NRBC # BLD AUTO: 0 10E3/UL
NRBC BLD AUTO-RTO: 0 /100
PH UR STRIP: 6 [PH] (ref 5–7)
PLATELET # BLD AUTO: 179 10E3/UL (ref 150–450)
POTASSIUM SERPL-SCNC: 4.1 MMOL/L (ref 3.4–5.3)
PROT SERPL-MCNC: 7.7 G/DL (ref 6.4–8.3)
RBC # BLD AUTO: 4.9 10E6/UL (ref 3.8–5.2)
RBC URINE: 1 /HPF
RSV RNA SPEC NAA+PROBE: NEGATIVE
SARS-COV-2 RNA RESP QL NAA+PROBE: NEGATIVE
SODIUM SERPL-SCNC: 135 MMOL/L (ref 135–145)
SP GR UR STRIP: 1.01 (ref 1–1.03)
SQUAMOUS EPITHELIAL: 1 /HPF
TROPONIN T SERPL HS-MCNC: <6 NG/L
UROBILINOGEN UR STRIP-MCNC: NORMAL MG/DL
WBC # BLD AUTO: 7.9 10E3/UL (ref 4–11)
WBC URINE: 0 /HPF

## 2023-12-08 PROCEDURE — 87086 URINE CULTURE/COLONY COUNT: CPT | Performed by: EMERGENCY MEDICINE

## 2023-12-08 PROCEDURE — 99285 EMERGENCY DEPT VISIT HI MDM: CPT | Mod: 25 | Performed by: EMERGENCY MEDICINE

## 2023-12-08 PROCEDURE — 36415 COLL VENOUS BLD VENIPUNCTURE: CPT | Performed by: EMERGENCY MEDICINE

## 2023-12-08 PROCEDURE — 80053 COMPREHEN METABOLIC PANEL: CPT | Performed by: EMERGENCY MEDICINE

## 2023-12-08 PROCEDURE — 87040 BLOOD CULTURE FOR BACTERIA: CPT | Performed by: EMERGENCY MEDICINE

## 2023-12-08 PROCEDURE — 258N000003 HC RX IP 258 OP 636: Performed by: EMERGENCY MEDICINE

## 2023-12-08 PROCEDURE — 250N000013 HC RX MED GY IP 250 OP 250 PS 637: Performed by: EMERGENCY MEDICINE

## 2023-12-08 PROCEDURE — 87637 SARSCOV2&INF A&B&RSV AMP PRB: CPT | Performed by: EMERGENCY MEDICINE

## 2023-12-08 PROCEDURE — 85025 COMPLETE CBC W/AUTO DIFF WBC: CPT | Performed by: EMERGENCY MEDICINE

## 2023-12-08 PROCEDURE — 96365 THER/PROPH/DIAG IV INF INIT: CPT | Performed by: EMERGENCY MEDICINE

## 2023-12-08 PROCEDURE — 96375 TX/PRO/DX INJ NEW DRUG ADDON: CPT | Performed by: EMERGENCY MEDICINE

## 2023-12-08 PROCEDURE — 96366 THER/PROPH/DIAG IV INF ADDON: CPT | Performed by: EMERGENCY MEDICINE

## 2023-12-08 PROCEDURE — 96361 HYDRATE IV INFUSION ADD-ON: CPT | Performed by: EMERGENCY MEDICINE

## 2023-12-08 PROCEDURE — 83605 ASSAY OF LACTIC ACID: CPT | Performed by: EMERGENCY MEDICINE

## 2023-12-08 PROCEDURE — 93010 ELECTROCARDIOGRAM REPORT: CPT | Performed by: EMERGENCY MEDICINE

## 2023-12-08 PROCEDURE — 93005 ELECTROCARDIOGRAM TRACING: CPT | Performed by: EMERGENCY MEDICINE

## 2023-12-08 PROCEDURE — 71046 X-RAY EXAM CHEST 2 VIEWS: CPT

## 2023-12-08 PROCEDURE — 81001 URINALYSIS AUTO W/SCOPE: CPT | Performed by: EMERGENCY MEDICINE

## 2023-12-08 PROCEDURE — 84484 ASSAY OF TROPONIN QUANT: CPT | Performed by: EMERGENCY MEDICINE

## 2023-12-08 PROCEDURE — 250N000011 HC RX IP 250 OP 636: Mod: JZ | Performed by: EMERGENCY MEDICINE

## 2023-12-08 PROCEDURE — 99284 EMERGENCY DEPT VISIT MOD MDM: CPT | Mod: 25 | Performed by: EMERGENCY MEDICINE

## 2023-12-08 RX ORDER — ONDANSETRON 4 MG/1
4 TABLET, ORALLY DISINTEGRATING ORAL EVERY 8 HOURS PRN
Qty: 10 TABLET | Refills: 0 | Status: SHIPPED | OUTPATIENT
Start: 2023-12-08

## 2023-12-08 RX ORDER — ACETAMINOPHEN 500 MG
1000 TABLET ORAL ONCE
Status: COMPLETED | OUTPATIENT
Start: 2023-12-08 | End: 2023-12-08

## 2023-12-08 RX ORDER — AMLODIPINE BESYLATE 5 MG/1
5 TABLET ORAL DAILY
COMMUNITY
Start: 2023-10-13

## 2023-12-08 RX ORDER — ONDANSETRON 2 MG/ML
4 INJECTION INTRAMUSCULAR; INTRAVENOUS ONCE
Status: COMPLETED | OUTPATIENT
Start: 2023-12-08 | End: 2023-12-08

## 2023-12-08 RX ADMIN — ONDANSETRON 4 MG: 2 INJECTION INTRAMUSCULAR; INTRAVENOUS at 21:03

## 2023-12-08 RX ADMIN — SODIUM CHLORIDE 1848 ML: 9 INJECTION, SOLUTION INTRAVENOUS at 21:02

## 2023-12-08 RX ADMIN — FAMOTIDINE 20 MG: 20 INJECTION, SOLUTION INTRAVENOUS at 22:25

## 2023-12-08 RX ADMIN — ACETAMINOPHEN 1000 MG: 500 TABLET ORAL at 20:46

## 2023-12-08 ASSESSMENT — ACTIVITIES OF DAILY LIVING (ADL)
ADLS_ACUITY_SCORE: 35
ADLS_ACUITY_SCORE: 35

## 2023-12-08 ASSESSMENT — ENCOUNTER SYMPTOMS
FEVER: 1
COUGH: 1

## 2023-12-09 VITALS
BODY MASS INDEX: 27.15 KG/M2 | HEART RATE: 98 BPM | TEMPERATURE: 98.2 F | DIASTOLIC BLOOD PRESSURE: 65 MMHG | OXYGEN SATURATION: 100 % | HEIGHT: 67 IN | WEIGHT: 173 LBS | RESPIRATION RATE: 18 BRPM | SYSTOLIC BLOOD PRESSURE: 108 MMHG

## 2023-12-09 LAB
ATRIAL RATE - MUSE: 108 BPM
DIASTOLIC BLOOD PRESSURE - MUSE: NORMAL MMHG
INTERPRETATION ECG - MUSE: NORMAL
P AXIS - MUSE: 58 DEGREES
PR INTERVAL - MUSE: 202 MS
QRS DURATION - MUSE: 100 MS
QT - MUSE: 328 MS
QTC - MUSE: 439 MS
R AXIS - MUSE: -8 DEGREES
SYSTOLIC BLOOD PRESSURE - MUSE: NORMAL MMHG
T AXIS - MUSE: 52 DEGREES
VENTRICULAR RATE- MUSE: 108 BPM

## 2023-12-09 NOTE — ED PROVIDER NOTES
[facility] EMERGENCY DEPARTMENT ([facility])    12/08/23      ED PROVIDER NOTE   ED 06    History     Chief Complaint   Patient presents with    Fever    Cough    Abdominal Pain    Nausea & Vomiting     The history is provided by the patient and medical records.   Fever  Associated symptoms: cough    Cough  Associated symptoms: fever      Orquidea Romero is a 58 year old female who presents for evaluation of fever.  The patient is brought in by family.  She reports having generalized bodyaches, abdominal pain, headaches, nausea and vomiting as well as a cough and fever for 2 days.  Symptoms have been worsening since onset and she has had difficulty tolerating oral intake since yesterday. With the vomiting she reports a burning feeling in her throat.    No known sick contacts.  Patient reports a history of frequent headaches.  She did take Tylenol yesterday at home but no other medications.     She denies urinary complaints or changes in bowel movements.    Past Medical History  Past Medical History:   Diagnosis Date    Hypertension     Thyroid disease      History reviewed. No pertinent surgical history.  amLODIPine (NORVASC) 5 MG tablet  levothyroxine (SYNTHROID/LEVOTHROID) 25 MCG tablet  montelukast (SINGULAIR) 10 MG tablet  omeprazole (PRILOSEC) 40 MG DR capsule  ondansetron (ZOFRAN ODT) 4 MG ODT tab  acetaminophen (TYLENOL) 325 MG tablet  cetirizine (ZYRTEC) 10 MG tablet  cholecalciferol 50 MCG (2000 UT) tablet  diphenhydrAMINE (BENADRYL) 25 MG capsule  fluticasone (FLONASE) 50 MCG/ACT nasal spray  loratadine (CLARITIN) 10 MG tablet  meclizine (ANTIVERT) 25 MG tablet  propranolol (INDERAL) 80 MG tablet  SUMAtriptan (IMITREX) 50 MG tablet      Allergies   Allergen Reactions    Sulfa Antibiotics      Other reaction(s): Edema, Hives     Family History  History reviewed. No pertinent family history.  Social History   Social History     Tobacco Use    Smoking status: Never    Smokeless tobacco: Never  "  Substance Use Topics    Alcohol use: No    Drug use: No         A complete review of systems was performed with pertinent positives and negatives noted in the HPI, and all other systems negative.    Physical Exam   BP: 122/82  Pulse: (!) 123  Temp: (!) 102.3  F (39.1  C)  Resp: 20  Height: 170.2 cm (5' 7\")  Weight: 78.5 kg (173 lb)  SpO2: 97 %  Physical Exam  Vitals and nursing note reviewed.   Constitutional:       General: She is not in acute distress.     Appearance: She is well-developed. She is not toxic-appearing.   HENT:      Head: Normocephalic and atraumatic.      Nose: Nose normal. No congestion.      Mouth/Throat:      Mouth: Mucous membranes are moist.      Pharynx: Oropharynx is clear.   Eyes:      Extraocular Movements: Extraocular movements intact.      Conjunctiva/sclera: Conjunctivae normal.      Pupils: Pupils are equal, round, and reactive to light.   Cardiovascular:      Rate and Rhythm: Regular rhythm. Tachycardia present.      Heart sounds: No murmur heard.  Pulmonary:      Effort: Pulmonary effort is normal. No respiratory distress.      Breath sounds: No rales.   Abdominal:      General: There is no distension.      Palpations: Abdomen is soft.      Tenderness: There is no abdominal tenderness. There is no guarding.   Musculoskeletal:         General: No swelling. Normal range of motion.      Cervical back: Normal range of motion.   Skin:     General: Skin is warm and dry.   Neurological:      General: No focal deficit present.      Mental Status: She is alert and oriented to person, place, and time.           ED Course, Procedures, & Data      Procedures                      Results for orders placed or performed during the hospital encounter of 12/08/23   XR Chest 2 Views     Status: None    Narrative    EXAM: XR CHEST 2 VIEWS  LOCATION: [facility]  DATE: 12/8/2023    INDICATION: Fever  COMPARISON: 07/25/2017      Impression    IMPRESSION: " Mild elevation right hemidiaphragm.  Heart size and pulmonary vascularity within normal limits. No focal lung infiltrates. Osseous structures grossly intact. Visualized upper abdomen unremarkable.   Comprehensive metabolic panel     Status: Abnormal   Result Value Ref Range    Sodium 135 135 - 145 mmol/L    Potassium 4.1 3.4 - 5.3 mmol/L    Carbon Dioxide (CO2) 30 (H) 22 - 29 mmol/L    Anion Gap 8 7 - 15 mmol/L    Urea Nitrogen 9.7 8.0 - 23.0 mg/dL    Creatinine 0.64 0.51 - 0.95 mg/dL    GFR Estimate >90 >60 mL/min/1.73m2    Calcium 9.1 8.6 - 10.0 mg/dL    Chloride 97 (L) 98 - 107 mmol/L    Glucose 132 (H) 70 - 99 mg/dL    Alkaline Phosphatase 91 40 - 150 U/L    AST 18 0 - 45 U/L    ALT 9 0 - 50 U/L    Protein Total 7.7 6.4 - 8.3 g/dL    Albumin 4.3 3.5 - 5.2 g/dL    Bilirubin Total 0.3 <=1.2 mg/dL   Lactic acid whole blood     Status: Normal   Result Value Ref Range    Lactic Acid 1.8 0.7 - 2.0 mmol/L   UA with Microscopic     Status: Abnormal   Result Value Ref Range    Color Urine Light Yellow Colorless, Straw, Light Yellow, Yellow    Appearance Urine Clear Clear    Glucose Urine Negative Negative mg/dL    Bilirubin Urine Negative Negative    Ketones Urine 10 (A) Negative mg/dL    Specific Gravity Urine 1.015 1.003 - 1.035    Blood Urine Negative Negative    pH Urine 6.0 5.0 - 7.0    Protein Albumin Urine Negative Negative mg/dL    Urobilinogen Urine Normal Normal, 2.0 mg/dL    Nitrite Urine Negative Negative    Leukocyte Esterase Urine Negative Negative    Mucus Urine Present (A) None Seen /LPF    RBC Urine 1 <=2 /HPF    WBC Urine 0 <=5 /HPF    Squamous Epithelials Urine 1 <=1 /HPF   Asymptomatic Influenza A/B, RSV, & SARS-CoV2 PCR (COVID-19) Nasopharyngeal     Status: Abnormal    Specimen: Nasopharyngeal; Swab   Result Value Ref Range    Influenza A PCR Positive (A) Negative    Influenza B PCR Negative Negative    RSV PCR Negative Negative    SARS CoV2 PCR Negative Negative    Narrative    Testing was performed  using the Xpert Xpress CoV2/Flu/RSV Assay on the [employer] GeneXpert Instrument. This test should be ordered for the detection of SARS-CoV-2, influenza, and RSV viruses in individuals who meet clinical and/or epidemiological criteria. Test performance is unknown in asymptomatic patients. This test is for in vitro diagnostic use under the FDA EUA for laboratories certified under CLIA to perform high or moderate complexity testing. This test has not been FDA cleared or approved. A negative result does not rule out the presence of PCR inhibitors in the specimen or target RNA in concentration below the limit of detection for the assay. If only one viral target is positive but coinfection with multiple targets is suspected, the sample should be re-tested with another FDA cleared, approved, or authorized test, if coinfection would change clinical management. This test was validated by the [facility]. These laboratories are certified under the Clinical Laboratory Improvement Amendments of 1988 (CLIA-88) as qualified to perform high complexity laboratory testing.   Troponin T, High Sensitivity     Status: Normal   Result Value Ref Range    Troponin T, High Sensitivity <6 <=14 ng/L   CBC with platelets and differential     Status: Abnormal   Result Value Ref Range    WBC Count 7.9 4.0 - 11.0 10e3/uL    RBC Count 4.90 3.80 - 5.20 10e6/uL    Hemoglobin 14.0 11.7 - 15.7 g/dL    Hematocrit 41.1 35.0 - 47.0 %    MCV 84 78 - 100 fL    MCH 28.6 26.5 - 33.0 pg    MCHC 34.1 31.5 - 36.5 g/dL    RDW 12.4 10.0 - 15.0 %    Platelet Count 179 150 - 450 10e3/uL    % Neutrophils 85 %    % Lymphocytes 8 %    % Monocytes 7 %    % Eosinophils 0 %    % Basophils 0 %    % Immature Granulocytes 0 %    NRBCs per 100 WBC 0 <1 /100    Absolute Neutrophils 6.6 1.6 - 8.3 10e3/uL    Absolute Lymphocytes 0.7 (L) 0.8 - 5.3 10e3/uL    Absolute Monocytes 0.5 0.0 - 1.3 10e3/uL    Absolute Eosinophils 0.0 0.0 - 0.7 10e3/uL    Absolute  Basophils 0.0 0.0 - 0.2 10e3/uL    Absolute Immature Granulocytes 0.0 <=0.4 10e3/uL    Absolute NRBCs 0.0 10e3/uL   EKG 12 lead     Status: None (Preliminary result)   Result Value Ref Range    Systolic Blood Pressure  mmHg    Diastolic Blood Pressure  mmHg    Ventricular Rate 108 BPM    Atrial Rate 108 BPM    LA Interval 202 ms    QRS Duration 100 ms     ms    QTc 439 ms    P Axis 58 degrees    R AXIS -8 degrees    T Axis 52 degrees    Interpretation ECG       Sinus tachycardia  Right atrial enlargement  Borderline ECG     CBC with platelets differential     Status: Abnormal    Narrative    The following orders were created for panel order CBC with platelets differential.  Procedure                               Abnormality         Status                     ---------                               -----------         ------                     CBC with platelets and d...[[id number]]  Abnormal            Final result                 Please view results for these tests on the individual orders.     Medications   famotidine (PEPCID) infusion 20 mg (has no administration in time range)   sodium chloride 0.9% BOLUS 1,848 mL (1,848 mLs Intravenous $New Bag 12/8/23 2102)   acetaminophen (TYLENOL) tablet 1,000 mg (1,000 mg Oral $Given 12/8/23 2046)   ondansetron (ZOFRAN) injection 4 mg (4 mg Intravenous $Given 12/8/23 2103)     Labs Ordered and Resulted from Time of ED Arrival to Time of ED Departure   COMPREHENSIVE METABOLIC PANEL - Abnormal       Result Value    Sodium 135      Potassium 4.1      Carbon Dioxide (CO2) 30 (*)     Anion Gap 8      Urea Nitrogen 9.7      Creatinine 0.64      GFR Estimate >90      Calcium 9.1      Chloride 97 (*)     Glucose 132 (*)     Alkaline Phosphatase 91      AST 18      ALT 9      Protein Total 7.7      Albumin 4.3      Bilirubin Total 0.3     ROUTINE UA WITH MICROSCOPIC - Abnormal    Color Urine Light Yellow      Appearance Urine Clear      Glucose Urine Negative      Bilirubin  Urine Negative      Ketones Urine 10 (*)     Specific Gravity Urine 1.015      Blood Urine Negative      pH Urine 6.0      Protein Albumin Urine Negative      Urobilinogen Urine Normal      Nitrite Urine Negative      Leukocyte Esterase Urine Negative      Mucus Urine Present (*)     RBC Urine 1      WBC Urine 0      Squamous Epithelials Urine 1     INFLUENZA A/B, RSV, & SARS-COV2 PCR - Abnormal    Influenza A PCR Positive (*)     Influenza B PCR Negative      RSV PCR Negative      SARS CoV2 PCR Negative     CBC WITH PLATELETS AND DIFFERENTIAL - Abnormal    WBC Count 7.9      RBC Count 4.90      Hemoglobin 14.0      Hematocrit 41.1      MCV 84      MCH 28.6      MCHC 34.1      RDW 12.4      Platelet Count 179      % Neutrophils 85      % Lymphocytes 8      % Monocytes 7      % Eosinophils 0      % Basophils 0      % Immature Granulocytes 0      NRBCs per 100 WBC 0      Absolute Neutrophils 6.6      Absolute Lymphocytes 0.7 (*)     Absolute Monocytes 0.5      Absolute Eosinophils 0.0      Absolute Basophils 0.0      Absolute Immature Granulocytes 0.0      Absolute NRBCs 0.0     LACTIC ACID WHOLE BLOOD - Normal    Lactic Acid 1.8     TROPONIN T, HIGH SENSITIVITY - Normal    Troponin T, High Sensitivity <6     BLOOD CULTURE   BLOOD CULTURE   URINE CULTURE     XR Chest 2 Views   Final Result   IMPRESSION: Mild elevation right hemidiaphragm.  Heart size and pulmonary vascularity within normal limits. No focal lung infiltrates. Osseous structures grossly intact. Visualized upper abdomen unremarkable.             Critical care was not performed.     Medical Decision Making  The patient's presentation was of moderate complexity (an acute illness with systemic symptoms).    The patient's evaluation involved:  an assessment requiring an independent historian (patient's children)  review of external note(s) from 2 sources (outside ED notes, neurology note)  review of 3+ test result(s) ordered prior to this encounter (cbc, bmp,  covid testing)  ordering and/or review of 3+ test(s) in this encounter (see separate area of note for details)  independent interpretation of testing performed by another health professional (CXR)    The patient's management necessitated moderate risk (prescription drug management including medications given in the ED).    Assessment & Plan    58-year-old female here for fever, cough, nausea and vomiting.  She was febrile here and tachycardic on arrival.  There was high suspicion for infectious etiologies and blood work as well as blood cultures and other testing was obtained and fluids were given.  I also ordered Tylenol for her fever.    Testing was notable for a viral swab that was positive for influenza A.  A chest x-ray was obtained and reviewed and did not show signs of consolidation.  Her other blood work was notable for lactate within normal limits and absence of leukocytosis.  UA was negative.    EKG showed sinus tachycardia and troponin was undetectable.  Low suspicion for ACS given these findings and description of pain as a burning feeling after she vomited.    She was re-evaluated and reported improvement in symptoms. Vitals normalized. I discussed the signs with the patient and her family.  At this time it seems most likely her symptoms are due to influenza.  I provided a prescription for Zofran to help with her nausea and recommended other measures for symptom control at home.  I recommend that she follow-up with her primary care doctor and return precautions were discussed and all questions answered.  She was discharged in stable condition.    I have reviewed the nursing notes. I have reviewed the findings, diagnosis, plan and need for follow up with the patient.    New Prescriptions    ONDANSETRON (ZOFRAN ODT) 4 MG ODT TAB    Take 1 tablet (4 mg) by mouth every 8 hours as needed for nausea       Final diagnoses:   Influenza A       Nitin Mays I, Jai Begum, am serving as a trained medical  scribe to document services personally performed by Nitin Mays MD based on the provider's statements to me on December 8, 2023.  This document has been checked and approved by the attending provider.    I, Nitin Mays MD, was physically present and have reviewed and verified the accuracy of this note documented by Jai Begum, medical scribe.      Nitin Mays MD   [facility] EMERGENCY DEPARTMENT  12/8/2023     Nitin Mays MD  12/08/23 [id number]

## 2023-12-09 NOTE — ED TRIAGE NOTES
Patient c/o fever, headache, generalized body ache, abdominal discomfort, and nausea and vomiting. Patient said hey symptoms started last night. Patient said she vomits every time she eat or drinks. Patient said the last time she took Tylenol was around 11:30 am today.     Triage Assessment (Adult)       Row Name 12/08/23 1958          Triage Assessment    Airway WDL WDL        Respiratory WDL    Respiratory WDL X;cough     Cough Frequency infrequent        Skin Circulation/Temperature WDL    Skin Circulation/Temperature WDL WDL        Cardiac WDL    Cardiac WDL X;rhythm     Cardiac Rhythm ST        Peripheral/Neurovascular WDL    Peripheral Neurovascular WDL WDL        Cognitive/Neuro/Behavioral WDL    Cognitive/Neuro/Behavioral WDL WDL

## 2023-12-09 NOTE — DISCHARGE INSTRUCTIONS
You tested positive for influenza in the emergency room.  Influenza is a upper respiratory virus.  Your blood work was reassuring and your chest x-ray did not show signs of pneumonia.  I have sent a prescription for Zofran to help with the nausea to your pharmacy.  You should also take Tylenol or Motrin for body aches and fevers.     Be sure to stay well-hydrated.  If you continue to have vomiting and are not able to keep anything down even with the Zofran or you have other new symptoms or concerns return to the emergency department for reevaluation.    I also recommend you follow with your primary care doctor.

## 2023-12-10 LAB — BACTERIA UR CULT: NORMAL

## 2023-12-13 LAB — BACTERIA BLD CULT: NO GROWTH

## 2023-12-14 LAB — BACTERIA BLD CULT: NO GROWTH

## 2024-06-29 ENCOUNTER — HEALTH MAINTENANCE LETTER (OUTPATIENT)
Age: 59
End: 2024-06-29

## 2025-05-11 ENCOUNTER — HEALTH MAINTENANCE LETTER (OUTPATIENT)
Age: 60
End: 2025-05-11

## 2025-07-13 ENCOUNTER — HEALTH MAINTENANCE LETTER (OUTPATIENT)
Age: 60
End: 2025-07-13